# Patient Record
Sex: FEMALE | Race: WHITE | URBAN - METROPOLITAN AREA
[De-identification: names, ages, dates, MRNs, and addresses within clinical notes are randomized per-mention and may not be internally consistent; named-entity substitution may affect disease eponyms.]

---

## 2021-06-10 ENCOUNTER — OFFICE VISIT (OUTPATIENT)
Dept: OPHTHALMOLOGY | Facility: CLINIC | Age: 56
End: 2021-06-10
Attending: OPHTHALMOLOGY
Payer: COMMERCIAL

## 2021-06-10 DIAGNOSIS — H26.8 COMBINED FORM OF NONSENILE CATARACT OF BOTH EYES: ICD-10-CM

## 2021-06-10 DIAGNOSIS — H40.003 GLAUCOMA SUSPECT OF BOTH EYES: Primary | ICD-10-CM

## 2021-06-10 PROCEDURE — 92133 CPTRZD OPH DX IMG PST SGM ON: CPT | Performed by: OPHTHALMOLOGY

## 2021-06-10 PROCEDURE — 76519 ECHO EXAM OF EYE: CPT | Mod: 26 | Performed by: OPHTHALMOLOGY

## 2021-06-10 PROCEDURE — 92083 EXTENDED VISUAL FIELD XM: CPT | Performed by: OPHTHALMOLOGY

## 2021-06-10 PROCEDURE — 99204 OFFICE O/P NEW MOD 45 MIN: CPT | Performed by: OPHTHALMOLOGY

## 2021-06-10 PROCEDURE — 76519 ECHO EXAM OF EYE: CPT | Performed by: OPHTHALMOLOGY

## 2021-06-10 PROCEDURE — G0463 HOSPITAL OUTPT CLINIC VISIT: HCPCS

## 2021-06-10 PROCEDURE — 92025 CPTRIZED CORNEAL TOPOGRAPHY: CPT | Performed by: OPHTHALMOLOGY

## 2021-06-10 ASSESSMENT — REFRACTION_WEARINGRX
OD_CYLINDER: +2.00
OD_AXIS: 037
OS_SPHERE: -5.75
OD_SPHERE: -7.25
OS_AXIS: 142
OS_CYLINDER: +2.00
SPECS_TYPE: SVL

## 2021-06-10 ASSESSMENT — REFRACTION_MANIFEST
OS_ADD: +1.00
OD_CYLINDER: +2.00
OS_SPHERE: -6.25
OS_CYLINDER: +1.75
OD_AXIS: 037
OS_AXIS: 142
OD_SPHERE: -7.75
OD_ADD: +1.00

## 2021-06-10 ASSESSMENT — VISUAL ACUITY
CORRECTION_TYPE: GLASSES
OS_BAT_LOW: 20/25
OD_PH_CC+: +1
OD_CC: 20/40
OS_BAT_HIGH: 20/50
OD_BAT_HIGH: 20/70
OD_BAT_LOW: 20/40
OD_CC+: -1
OS_BAT_MED: 20/40
OD_PH_CC: 20/30
OD_BAT_MED: 20/50
OS_CC: 20/25
METHOD: SNELLEN - LINEAR

## 2021-06-10 ASSESSMENT — TONOMETRY
OS_IOP_MMHG: 17
IOP_METHOD: APPLANATION
OD_IOP_MMHG: 16

## 2021-06-10 ASSESSMENT — EXTERNAL EXAM - LEFT EYE: OS_EXAM: NORMAL

## 2021-06-10 ASSESSMENT — PACHYMETRY
OD_CT(UM): 546
OS_CT(UM): 539

## 2021-06-10 ASSESSMENT — EXTERNAL EXAM - RIGHT EYE: OD_EXAM: NORMAL

## 2021-06-10 ASSESSMENT — CONF VISUAL FIELD
OD_NORMAL: 1
OS_NORMAL: 1
METHOD: COUNTING FINGERS

## 2021-06-10 ASSESSMENT — SLIT LAMP EXAM - LIDS
COMMENTS: MGD, TR BLEPHARITIS
COMMENTS: MGD, TR BLEPHARITIS

## 2021-06-10 ASSESSMENT — CUP TO DISC RATIO
OS_RATIO: 0.55
OD_RATIO: 0.35

## 2021-06-10 NOTE — NURSING NOTE
Chief Complaints and History of Present Illnesses   Patient presents with     Consult For     Chief Complaint(s) and History of Present Illness(es)     Consult For     Laterality: both eyes    Onset: gradual    Onset: years ago    Course: gradually worsening    Associated symptoms: dryness, glare and haloes.  Negative for eye pain, tearing, flashes, floaters and photophobia    Treatments tried: artificial tears    Response to treatment: mild improvement    Pain scale: 0/10              Comments     Pt here for annual CEE.  Pt states they have cataracts after being seen at [facility].  Pt states vision has gotten worse since last visit.      AT's LA NENA Velazuqez Citlaly 10, 2021 8:07 AM

## 2021-06-10 NOTE — PROGRESS NOTES
Chief Complaint(s) and History of Present Illness(es)     Consult For     Laterality: both eyes    Onset: gradual    Onset: years ago    Course: gradually worsening    Associated symptoms: dryness, glare and haloes.  Negative for eye pain,   tearing, flashes, floaters and photophobia    Treatments tried: artificial tears    Response to treatment: mild improvement    Pain scale: 0/10              Comments     Pt here for annual CEE.  Pt states they have cataracts after being seen   at [place].  Pt states vision has gotten worse since last visit.      AT's LA NENA Velazquez Citlaly 10, 2021 8:07 AM              Review of systems for the eyes was negative other than the pertinent positives/negatives listed in the HPI.      Assessment & Plan      Danna Goodman is a 56 year old female with the following diagnoses:   1. Glaucoma suspect of both eyes    2. 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  Noting progressive vision worsening in the right eye > left eye   Followed at [place] and no concerns of glaucoma during those visits.  Told of cataract in the right eye previously  Cataract, right eye > left eye   Visually significant    Stable ON appearance and OCT today  Visual field without evidence of glaucoma   Physiologic cupping, annual dilated fundus exam recommended    Risks, benefits and alternatives to cataract extraction/IOL implantation discussed; consent obtained.  Will schedule surgery today    Special equipment/needs:    Anesthesia:Topical  Dilation:Good  Iris expansion:Not needed  Pseudoexfoliation: No pseudoexfoliation  Trypan Blue: No   Right eye first  Visually significant astigmatism   Discussed surgical corrective options  Reviewed elective nature of surgical correction and patient responsible fee associated  The patient is considering toric Intraocular lens +/- Vivity, will call to confirm  Refractive goal TBD (leaning toward emmetropia)    [facility] I reviewed the indications, risks, benefits, and alternatives of the proposed surgical procedure including, but not limited to, failure obtain the desired result  and need for additional surgery, bleeding, infection, loss of vision, loss of the eye, and the remote possibility of permanent damage to any organ system or death with the use of anesthesia.  I provided multiple opportunities for the questions, answered all questions to the best of my ability, and confirmed that my answers and my discussion were understood.               Attending Physician Attestation:  Complete documentation of historical and exam elements from today's encounter can be found in the full encounter summary report (not reduplicated in this progress note).  I personally obtained the chief complaint(s) and history of present illness.  I confirmed and edited as necessary the review of systems, past medical/surgical history, family history, social history, and examination findings as documented by others; and I examined the patient myself.  I personally reviewed the relevant tests, images, and reports as documented above.  I formulated and edited as necessary the assessment and plan and discussed the findings and management plan with the patient and family. . - Poncho Addison MD

## 2021-06-22 ENCOUNTER — TELEPHONE (OUTPATIENT)
Dept: OPHTHALMOLOGY | Facility: CLINIC | Age: 56
End: 2021-06-22

## 2021-06-22 NOTE — TELEPHONE ENCOUNTER
Called patient to schedule procedure with Dr. Addison, there was no answer.  Left message with my direct line [phone].

## 2021-06-25 NOTE — TELEPHONE ENCOUNTER
Called patient to schedule procedure with Dr. Addison, there was no answer.  Left message with my direct line [phone].

## 2021-07-12 NOTE — TELEPHONE ENCOUNTER
Called patient to schedule procedure with Dr. Addison, there was no answer.  Left message with my direct line [phone].

## 2021-07-27 NOTE — TELEPHONE ENCOUNTER
Called patient to schedule procedure with Dr. Addison, there was no answer.  Left message with my direct line [phone].

## 2021-08-06 NOTE — TELEPHONE ENCOUNTER
Called patient to schedule procedure with Dr. Addison, there was no answer.  Left message with my direct line [phone].

## 2021-08-13 NOTE — TELEPHONE ENCOUNTER
Called patient to schedule procedure with Dr. Addison, there was no answer.  Left message with my direct line [phone].

## 2021-09-10 ENCOUNTER — TELEPHONE (OUTPATIENT)
Dept: OPHTHALMOLOGY | Facility: CLINIC | Age: 56
End: 2021-09-10

## 2021-09-10 NOTE — TELEPHONE ENCOUNTER
Called patient to schedule procedure with Dr. Addison, there was no answer.  Left message with my direct line [phone].

## 2021-10-12 ENCOUNTER — TELEPHONE (OUTPATIENT)
Dept: OPHTHALMOLOGY | Facility: CLINIC | Age: 56
End: 2021-10-12

## 2022-02-02 ENCOUNTER — OFFICE VISIT (OUTPATIENT)
Dept: FAMILY MEDICINE | Facility: CLINIC | Age: 57
End: 2022-02-02
Payer: COMMERCIAL

## 2022-02-02 VITALS
HEART RATE: 113 BPM | WEIGHT: 175.4 LBS | TEMPERATURE: 99.4 F | OXYGEN SATURATION: 94 % | SYSTOLIC BLOOD PRESSURE: 129 MMHG | BODY MASS INDEX: 27.47 KG/M2 | RESPIRATION RATE: 16 BRPM | DIASTOLIC BLOOD PRESSURE: 85 MMHG

## 2022-02-02 DIAGNOSIS — R53.82 CHRONIC FATIGUE: Primary | ICD-10-CM

## 2022-02-02 DIAGNOSIS — F32.1 CURRENT MODERATE EPISODE OF MAJOR DEPRESSIVE DISORDER, UNSPECIFIED WHETHER RECURRENT (H): ICD-10-CM

## 2022-02-02 DIAGNOSIS — L57.0 ACTINIC KERATOSIS: ICD-10-CM

## 2022-02-02 DIAGNOSIS — D64.9 ANEMIA, UNSPECIFIED TYPE: ICD-10-CM

## 2022-02-02 LAB
ALBUMIN SERPL-MCNC: 3.9 G/DL (ref 3.4–5)
ALP SERPL-CCNC: 96 U/L (ref 40–150)
ALT SERPL W P-5'-P-CCNC: 36 U/L (ref 0–50)
ANION GAP SERPL CALCULATED.3IONS-SCNC: 5 MMOL/L (ref 3–14)
AST SERPL W P-5'-P-CCNC: 28 U/L (ref 0–45)
BASOPHILS # BLD AUTO: 0 10E3/UL (ref 0–0.2)
BASOPHILS # BLD AUTO: 0 10E3/UL (ref 0–0.2)
BASOPHILS NFR BLD AUTO: 1 %
BASOPHILS NFR BLD AUTO: 1 %
BILIRUB SERPL-MCNC: 0.4 MG/DL (ref 0.2–1.3)
BUN SERPL-MCNC: 13 MG/DL (ref 7–30)
CALCIUM SERPL-MCNC: 9.3 MG/DL (ref 8.5–10.1)
CHLORIDE BLD-SCNC: 104 MMOL/L (ref 94–109)
CO2 SERPL-SCNC: 27 MMOL/L (ref 20–32)
CREAT SERPL-MCNC: 0.81 MG/DL (ref 0.52–1.04)
CRP SERPL-MCNC: <2.9 MG/L (ref 0–8)
EOSINOPHIL # BLD AUTO: 0.1 10E3/UL (ref 0–0.7)
EOSINOPHIL # BLD AUTO: 0.1 10E3/UL (ref 0–0.7)
EOSINOPHIL NFR BLD AUTO: 3 %
EOSINOPHIL NFR BLD AUTO: 3 %
ERYTHROCYTE [DISTWIDTH] IN BLOOD BY AUTOMATED COUNT: 12.2 % (ref 10–15)
ERYTHROCYTE [DISTWIDTH] IN BLOOD BY AUTOMATED COUNT: 12.2 % (ref 10–15)
GFR SERPL CREATININE-BSD FRML MDRD: 86 ML/MIN/1.73M2
GLUCOSE BLD-MCNC: 113 MG/DL (ref 70–99)
HCT VFR BLD AUTO: 39.4 % (ref 35–47)
HCT VFR BLD AUTO: 39.7 % (ref 35–47)
HGB BLD-MCNC: 12.4 G/DL (ref 11.7–15.7)
HGB BLD-MCNC: 12.5 G/DL (ref 11.7–15.7)
IMM GRANULOCYTES # BLD: 0 10E3/UL
IMM GRANULOCYTES # BLD: 0 10E3/UL
IMM GRANULOCYTES NFR BLD: 0 %
IMM GRANULOCYTES NFR BLD: 0 %
LYMPHOCYTES # BLD AUTO: 1.2 10E3/UL (ref 0.8–5.3)
LYMPHOCYTES # BLD AUTO: 1.3 10E3/UL (ref 0.8–5.3)
LYMPHOCYTES NFR BLD AUTO: 27 %
LYMPHOCYTES NFR BLD AUTO: 29 %
MCH RBC QN AUTO: 30 PG (ref 26.5–33)
MCH RBC QN AUTO: 30.6 PG (ref 26.5–33)
MCHC RBC AUTO-ENTMCNC: 31.2 G/DL (ref 31.5–36.5)
MCHC RBC AUTO-ENTMCNC: 31.7 G/DL (ref 31.5–36.5)
MCV RBC AUTO: 96 FL (ref 78–100)
MCV RBC AUTO: 96 FL (ref 78–100)
MONOCYTES # BLD AUTO: 0.4 10E3/UL (ref 0–1.3)
MONOCYTES # BLD AUTO: 0.4 10E3/UL (ref 0–1.3)
MONOCYTES NFR BLD AUTO: 10 %
MONOCYTES NFR BLD AUTO: 9 %
NEUTROPHILS # BLD AUTO: 2.5 10E3/UL (ref 1.6–8.3)
NEUTROPHILS # BLD AUTO: 2.6 10E3/UL (ref 1.6–8.3)
NEUTROPHILS NFR BLD AUTO: 58 %
NEUTROPHILS NFR BLD AUTO: 59 %
NRBC # BLD AUTO: 0 10E3/UL
NRBC # BLD AUTO: 0 10E3/UL
NRBC BLD AUTO-RTO: 0 /100
NRBC BLD AUTO-RTO: 0 /100
PLATELET # BLD AUTO: 194 10E3/UL (ref 150–450)
PLATELET # BLD AUTO: 198 10E3/UL (ref 150–450)
POTASSIUM BLD-SCNC: 4.5 MMOL/L (ref 3.4–5.3)
PROT SERPL-MCNC: 7.4 G/DL (ref 6.8–8.8)
RBC # BLD AUTO: 4.09 10E6/UL (ref 3.8–5.2)
RBC # BLD AUTO: 4.13 10E6/UL (ref 3.8–5.2)
RETICS # AUTO: 0.07 10E6/UL (ref 0.03–0.1)
RETICS/RBC NFR AUTO: 1.7 % (ref 0.5–2)
SODIUM SERPL-SCNC: 136 MMOL/L (ref 133–144)
TSH SERPL DL<=0.005 MIU/L-ACNC: 2.26 MU/L (ref 0.4–4)
WBC # BLD AUTO: 4.4 10E3/UL (ref 4–11)
WBC # BLD AUTO: 4.4 10E3/UL (ref 4–11)

## 2022-02-02 PROCEDURE — 99203 OFFICE O/P NEW LOW 30 MIN: CPT | Mod: 25 | Performed by: FAMILY MEDICINE

## 2022-02-02 PROCEDURE — 80050 GENERAL HEALTH PANEL: CPT | Performed by: FAMILY MEDICINE

## 2022-02-02 PROCEDURE — 85045 AUTOMATED RETICULOCYTE COUNT: CPT | Performed by: FAMILY MEDICINE

## 2022-02-02 PROCEDURE — 17000 DESTRUCT PREMALG LESION: CPT | Performed by: FAMILY MEDICINE

## 2022-02-02 PROCEDURE — 36415 COLL VENOUS BLD VENIPUNCTURE: CPT | Performed by: FAMILY MEDICINE

## 2022-02-02 PROCEDURE — 86140 C-REACTIVE PROTEIN: CPT | Performed by: FAMILY MEDICINE

## 2022-02-02 RX ORDER — PROPRANOLOL HYDROCHLORIDE 10 MG/1
10 TABLET ORAL 3 TIMES DAILY PRN
COMMUNITY
Start: 2020-12-30

## 2022-02-02 RX ORDER — METHYLPHENIDATE HYDROCHLORIDE 36 MG/1
36 TABLET, EXTENDED RELEASE ORAL
COMMUNITY
Start: 2021-07-15

## 2022-02-03 LAB
PATH REPORT.COMMENTS IMP SPEC: NORMAL
PATH REPORT.FINAL DX SPEC: NORMAL
PATH REPORT.MICROSCOPIC SPEC OTHER STN: NORMAL
PATH REPORT.MICROSCOPIC SPEC OTHER STN: NORMAL
PATH REPORT.RELEVANT HX SPEC: NORMAL

## 2022-02-05 NOTE — PROGRESS NOTES
Danna was seen today for lesion, results and fatigue.    Diagnoses and all orders for this visit:    Chronic fatigue.  Exam is reassuring as there is no sign of lymphadenopathy or other constitutional findings.  Will check a battery of screening lab tests.  -     Comprehensive Metabolic Panel; Future  -     TSH with free T4 reflex; Future  -     CRP inflammation; Future  -     Comprehensive Metabolic Panel  -     TSH with free T4 reflex  -     CRP inflammation    Current moderate episode of major depressive disorder, unspecified whether recurrent (H).  This is an active problem.  She will continue to take bupropion and see her therapist, both of which I endorsed.    Anemia, unspecified type.  Though there have been very mild declines in her blood cell lines over the past several years, they have all been still in the normal range, with the exception of her most recent hemoglobin which was very slightly low.  We will recheck CBC, reticulocyte count, and blood morphology.  -     CBC with platelets differential; Future  -     Reticulocyte count; Future  -     Lab Blood Morphology Pathologist Review; Future  -     CBC with platelets differential  -     Cancel: Reticulocyte count  -     Lab Blood Morphology Pathologist Review    Actinic keratosis.  The actinic keratosis was frozen x2 with liquid nitrogen in a 5-second freeze.  Recheck in retreat in 4 to 6weeks.            Subjective   Danna is a 56 year old who presents for the following health issues: Depression, concern over anemia, cheek lesion.    HPI chief concern today is of fatigue and a worry about a possible blood dyscrasia.  She is a  and is very concerned that over the past 5 years her blood cell line counts have been gradually decreasing.  Denies infections or bleeding episodes.  She does complain prominently of fatigue, however.  Fatigue is generalized with lack of energy and motivation.  Difficult to separate this out from chronic  depression, which has been more active recently.  Denies weight loss.    Also concerned about a lesion on her left cheek.          Review of Systems         Objective    /85 (BP Location: Left arm, Patient Position: Sitting, Cuff Size: Adult Regular)   Pulse 113   Temp 99.4  F (37.4  C) (Oral)   Resp 16   Wt 79.6 kg (175 lb 6.4 oz)   SpO2 94%   Breastfeeding No   BMI 27.47 kg/m    Body mass index is 27.47 kg/m .  Physical Exam  Constitutional:       General: She is not in acute distress.     Appearance: She is not ill-appearing or diaphoretic.   HENT:      Head: Normocephalic.      Mouth/Throat:      Pharynx: Oropharynx is clear.   Neck:      Comments: No thyromegaly  Cardiovascular:      Rate and Rhythm: Regular rhythm.      Heart sounds: Normal heart sounds.   Pulmonary:      Effort: Respiratory distress present.      Breath sounds: Normal breath sounds.   Chest:   Breasts:      Right: No supraclavicular adenopathy.      Left: No supraclavicular adenopathy.       Abdominal:      Palpations: Abdomen is soft.      Comments: No hepatosplenomegaly   Musculoskeletal:         General: No swelling.   Lymphadenopathy:      Head:      Right side of head: No submental, submandibular, tonsillar, preauricular, posterior auricular or occipital adenopathy.      Left side of head: No submental, submandibular, tonsillar, preauricular, posterior auricular or occipital adenopathy.      Cervical: No cervical adenopathy.      Right cervical: No superficial, deep or posterior cervical adenopathy.     Left cervical: No superficial, deep or posterior cervical adenopathy.      Upper Body:      Right upper body: No supraclavicular adenopathy.      Left upper body: No supraclavicular adenopathy.   Skin:     Comments: There is an actinic keratosis on the left cheek   Neurological:      Mental Status: She is alert.   Psychiatric:         Thought Content: Thought content normal.      Comments: Mildly depressed mood            No  results found for any previous visit.

## 2022-02-26 ENCOUNTER — HEALTH MAINTENANCE LETTER (OUTPATIENT)
Age: 57
End: 2022-02-26

## 2022-04-13 ENCOUNTER — OFFICE VISIT (OUTPATIENT)
Dept: FAMILY MEDICINE | Facility: CLINIC | Age: 57
End: 2022-04-13
Payer: COMMERCIAL

## 2022-04-13 VITALS
HEART RATE: 90 BPM | BODY MASS INDEX: 26.75 KG/M2 | DIASTOLIC BLOOD PRESSURE: 73 MMHG | WEIGHT: 170.8 LBS | OXYGEN SATURATION: 97 % | SYSTOLIC BLOOD PRESSURE: 109 MMHG | RESPIRATION RATE: 18 BRPM | TEMPERATURE: 98.9 F

## 2022-04-13 DIAGNOSIS — F32.1 CURRENT MODERATE EPISODE OF MAJOR DEPRESSIVE DISORDER, UNSPECIFIED WHETHER RECURRENT (H): ICD-10-CM

## 2022-04-13 DIAGNOSIS — L57.0 ACTINIC KERATOSIS: Primary | ICD-10-CM

## 2022-04-13 DIAGNOSIS — Z12.11 SCREEN FOR COLON CANCER: ICD-10-CM

## 2022-04-13 DIAGNOSIS — Z12.31 VISIT FOR SCREENING MAMMOGRAM: ICD-10-CM

## 2022-04-13 PROCEDURE — 99214 OFFICE O/P EST MOD 30 MIN: CPT | Performed by: FAMILY MEDICINE

## 2022-04-13 RX ORDER — CLONAZEPAM 0.5 MG/1
TABLET ORAL 2 TIMES DAILY
COMMUNITY
Start: 2022-04-02

## 2022-04-13 ASSESSMENT — PATIENT HEALTH QUESTIONNAIRE - PHQ9: SUM OF ALL RESPONSES TO PHQ QUESTIONS 1-9: 14

## 2022-04-13 NOTE — PATIENT INSTRUCTIONS
Stay on same medicines.    Schedule colonoscopy. If you have not heard from the scheduling office within 2 business days, please call [phone].    If cheek lesion scales up, come back.

## 2022-04-13 NOTE — PROGRESS NOTES
"  65+ Care Package  {TIP MA to complete blue section and provider to do black section :334873}      Danna was seen today for recheck and results.    Diagnoses and all orders for this visit:    Visit for screening mammogram    Screen for colon cancer    Screening for HIV (human immunodeficiency virus)    Need for hepatitis C screening test    Screening for hyperlipidemia        Date of last Wellness Visit:*** {TIP Date is in Health Maintenance  :760081}    Matters  Code Status: No Order      ADVANCED CARE DIRECTIVES {ADVANCED CARE DIRECTIVES:535644::\"unknown\"} / {POLST 4M:452414}  { Link to document Serious Illness Conversation  TIP: Document goals, fears/worries,trade-offs} :992703}    No flowsheet data found.     Medications  Current Outpatient Medications   Medication Sig Dispense Refill     BuPROPion HCl (WELLBUTRIN PO)         clonazePAM (KLONOPIN) 0.5 MG tablet 2 times daily       Methylphenidate HCl ER 36 MG 24H tablet Take 36 mg by mouth       propranolol (INDERAL) 10 MG tablet Take 10 mg by mouth 3 times daily as needed       ARTIFICIAL TEAR OP Apply 1 drop to eye as needed (Patient not taking: Reported on 4/13/2022)       Estropipate (ORTHO-EST 0.625 PO) Take  by mouth.   (Patient not taking: No sig reported)       Medications of Risk:   Currently taking Opioids? {YES/NO:60}  Currently taking Benzodiazepines?  {YES/NO:60}  Currently taking Antipsychotics? {YES/NO:60}  Number of medications on med list: ***  {TIP Medications activity lists number of meds :329902}    Mentation  Does the patient have a cognitive impairment diagnosis on the problem list? {YES***/NO:60}  Does the patient have a depression diagnosis on the problem list? {YES***/NO:60}  PHQ-2 Score:   PHQ-2 ( 1999 [employer]) 4/13/2022 2/2/2022   Q1: Little interest or pleasure in doing things 2 1   Q2: Feeling down, depressed or hopeless 2 1   PHQ-2 Score 4 2   PHQ-2 Total Score (12-17 Years)- Positive if 3 or more points; Administer PHQ-A if " positive - -      PHQ9x3 No flowsheet data found.  Mini Cog: No flowsheet data found.  Caregiver attended visit with patient and received counseling and resources for support of memory.  {YES/NO:60}    Patient Active Problem List   Diagnosis     Current moderate episode of major depressive disorder, unspecified whether recurrent (H)       Mobility   Are there mobility concerns for this patient?: ***  Adaptive Equipment/DME that patient uses: {DME:642156}  Refreshable SmartLink to last Falls Risk assessment score and date:   No data recorded          Patient Health status: {Health status:519069}        {PROVIDER CHARTING PREFERENCE:583008}    Vishnu Clay is a 56 year old who presents for the following health issues {ACCOMPANIED BY STATEMENT (Optional):775785}    HPI     {SUPERLIST (Optional):989023}  {additonal problems for provider to add (Optional):216521}    Review of Systems   {ROS COMP (Optional):185717}      Objective    /73 (BP Location: Left arm, Patient Position: Sitting, Cuff Size: Adult Large)   Pulse 90   Temp 98.9  F (37.2  C) (Oral)   Resp 18   SpO2 97%   Breastfeeding No   There is no height or weight on file to calculate BMI.  Physical Exam   {Exam List (Optional):210079}    {Diagnostic Test Results (Optional):204146}    {AMBULATORY ATTESTATION (Optional):103490}

## 2022-04-17 NOTE — PROGRESS NOTES
Danna was seen today for recheck and results.    Diagnoses and all orders for this visit:    Actinic keratosis.  There is mild erythema over the site but I cannot appreciate any abnormality of skin integrity.  This could be reactive from the last application of liquid nitrogen we will watch this for now.    Visit for screening mammogram.  She states that she will take care of this as it is due.    Screen for colon cancer.  Due for colon cancer screening and she agreed to a colonoscopy.  -     Adult Gastro Ref - Procedure Only; Future    Current moderate episode of major depressive disorder, unspecified whether recurrent (H).  Continues to struggle and PHQ score today is 17.  Is under psychiatrist care who is prescribing bupropion and clonazepam, and she will continue in therapy.            Subjective   Danna is a 56 year old who presents for the following health issues     HPI here for follow-up of several conditions.  She is still struggling with affect of disorder.  Many stressors in her life, including caring for her aging parents.  Actively under the care of a psychiatrist and seeing a psychologist regularly.    She would like the site of the actinic keratosis on her left cheek checked.          Review of Systems         Objective    /73 (BP Location: Left arm, Patient Position: Sitting, Cuff Size: Adult Large)   Pulse 90   Temp 98.9  F (37.2  C) (Oral)   Resp 18   Wt 77.5 kg (170 lb 12.8 oz)   SpO2 97%   Breastfeeding No   BMI 26.75 kg/m    Body mass index is 26.75 kg/m .  Physical Exam  Constitutional:       General: She is not in acute distress.     Appearance: She is not ill-appearing or diaphoretic.   HENT:      Head: Normocephalic.      Mouth/Throat:      Pharynx: Oropharynx is clear.   Neck:      Comments: No thyromegaly  Cardiovascular:      Rate and Rhythm: Regular rhythm.      Heart sounds: Normal heart sounds.   Pulmonary:      Effort: No respiratory distress.      Breath sounds:  Normal breath sounds.   Musculoskeletal:         General: No swelling.   Skin:     Comments: Small area of mild erythema corresponding to previous site of actinic keratosis.  No scaling.   Neurological:      Mental Status: She is alert.   Psychiatric:         Thought Content: Thought content normal.      Comments: Mildly depressed mood            Office Visit on 02/02/2022   Component Date Value Ref Range Status     Sodium 02/02/2022 136  133 - 144 mmol/L Final     Potassium 02/02/2022 4.5  3.4 - 5.3 mmol/L Final     Chloride 02/02/2022 104  94 - 109 mmol/L Final     Carbon Dioxide (CO2) 02/02/2022 27  20 - 32 mmol/L Final     Anion Gap 02/02/2022 5  3 - 14 mmol/L Final     Urea Nitrogen 02/02/2022 13  7 - 30 mg/dL Final     Creatinine 02/02/2022 0.81  0.52 - 1.04 mg/dL Final     Calcium 02/02/2022 9.3  8.5 - 10.1 mg/dL Final     Glucose 02/02/2022 113 (A) 70 - 99 mg/dL Final     Alkaline Phosphatase 02/02/2022 96  40 - 150 U/L Final     AST 02/02/2022 28  0 - 45 U/L Final     ALT 02/02/2022 36  0 - 50 U/L Final     Protein Total 02/02/2022 7.4  6.8 - 8.8 g/dL Final     Albumin 02/02/2022 3.9  3.4 - 5.0 g/dL Final     Bilirubin Total 02/02/2022 0.4  0.2 - 1.3 mg/dL Final     GFR Estimate 02/02/2022 86  >60 mL/min/1.73m2 Final    Effective December 21, 2021 eGFRcr in adults is calculated using the 2021 CKD-EPI creatinine equation which includes age and gender (Janes et al., NEJM, DOI: 10.1056/MZYYuy5218855)     TSH 02/02/2022 2.26  0.40 - 4.00 mU/L Final     CRP Inflammation 02/02/2022 <2.9  0.0 - 8.0 mg/L Final     WBC Count 02/02/2022 4.4  4.0 - 11.0 10e3/uL Final     RBC Count 02/02/2022 4.13  3.80 - 5.20 10e6/uL Final     Hemoglobin 02/02/2022 12.4  11.7 - 15.7 g/dL Final     Hematocrit 02/02/2022 39.7  35.0 - 47.0 % Final     MCV 02/02/2022 96  78 - 100 fL Final     MCH 02/02/2022 30.0  26.5 - 33.0 pg Final     MCHC 02/02/2022 31.2 (A) 31.5 - 36.5 g/dL Final     RDW 02/02/2022 12.2  10.0 - 15.0 % Final      Platelet Count 02/02/2022 198  150 - 450 10e3/uL Final     % Neutrophils 02/02/2022 58  % Final     % Lymphocytes 02/02/2022 29  % Final     % Monocytes 02/02/2022 9  % Final     % Eosinophils 02/02/2022 3  % Final     % Basophils 02/02/2022 1  % Final     % Immature Granulocytes 02/02/2022 0  % Final     NRBCs per 100 WBC 02/02/2022 0  <1 /100 Final     Absolute Neutrophils 02/02/2022 2.5  1.6 - 8.3 10e3/uL Final     Absolute Lymphocytes 02/02/2022 1.3  0.8 - 5.3 10e3/uL Final     Absolute Monocytes 02/02/2022 0.4  0.0 - 1.3 10e3/uL Final     Absolute Eosinophils 02/02/2022 0.1  0.0 - 0.7 10e3/uL Final     Absolute Basophils 02/02/2022 0.0  0.0 - 0.2 10e3/uL Final     Absolute Immature Granulocytes 02/02/2022 0.0  <=0.4 10e3/uL Final     Absolute NRBCs 02/02/2022 0.0  10e3/uL Final     Final Diagnosis 02/02/2022    Final                    Value:This result contains rich text formatting which cannot be displayed here.     Clinical Information 02/02/2022    Final                    Value:This result contains rich text formatting which cannot be displayed here.     Peripheral Smear 02/02/2022    Final                    Value:This result contains rich text formatting which cannot be displayed here.     Peripheral Hematologic Data 02/02/2022    Final                    Value:This result contains rich text formatting which cannot be displayed here.     Performing Labs 02/02/2022    Final                    Value:This result contains rich text formatting which cannot be displayed here.     WBC Count 02/02/2022 4.4  4.0 - 11.0 10e3/uL Final     RBC Count 02/02/2022 4.09  3.80 - 5.20 10e6/uL Final     Hemoglobin 02/02/2022 12.5  11.7 - 15.7 g/dL Final     Hematocrit 02/02/2022 39.4  35.0 - 47.0 % Final     MCV 02/02/2022 96  78 - 100 fL Final     MCH 02/02/2022 30.6  26.5 - 33.0 pg Final     MCHC 02/02/2022 31.7  31.5 - 36.5 g/dL Final     RDW 02/02/2022 12.2  10.0 - 15.0 % Final     Platelet Count 02/02/2022 194  150 -  450 10e3/uL Final     % Neutrophils 02/02/2022 59  % Final     % Lymphocytes 02/02/2022 27  % Final     % Monocytes 02/02/2022 10  % Final     % Eosinophils 02/02/2022 3  % Final     % Basophils 02/02/2022 1  % Final     % Immature Granulocytes 02/02/2022 0  % Final     NRBCs per 100 WBC 02/02/2022 0  <1 /100 Final     Absolute Neutrophils 02/02/2022 2.6  1.6 - 8.3 10e3/uL Final     Absolute Lymphocytes 02/02/2022 1.2  0.8 - 5.3 10e3/uL Final     Absolute Monocytes 02/02/2022 0.4  0.0 - 1.3 10e3/uL Final     Absolute Eosinophils 02/02/2022 0.1  0.0 - 0.7 10e3/uL Final     Absolute Basophils 02/02/2022 0.0  0.0 - 0.2 10e3/uL Final     Absolute Immature Granulocytes 02/02/2022 0.0  <=0.4 10e3/uL Final     Absolute NRBCs 02/02/2022 0.0  10e3/uL Final     % Reticulocyte 02/02/2022 1.7  0.5 - 2.0 % Final     Absolute Reticulocyte 02/02/2022 0.071  0.025 - 0.095 10e6/uL Final

## 2022-05-25 ENCOUNTER — OFFICE VISIT (OUTPATIENT)
Dept: FAMILY MEDICINE | Facility: CLINIC | Age: 57
End: 2022-05-25
Payer: COMMERCIAL

## 2022-05-25 VITALS
WEIGHT: 178.4 LBS | BODY MASS INDEX: 27.94 KG/M2 | RESPIRATION RATE: 16 BRPM | SYSTOLIC BLOOD PRESSURE: 115 MMHG | DIASTOLIC BLOOD PRESSURE: 79 MMHG | HEART RATE: 77 BPM | OXYGEN SATURATION: 97 %

## 2022-05-25 DIAGNOSIS — R63.5 WEIGHT GAIN: Primary | ICD-10-CM

## 2022-05-25 PROCEDURE — 99213 OFFICE O/P EST LOW 20 MIN: CPT | Performed by: FAMILY MEDICINE

## 2022-05-25 NOTE — PROGRESS NOTES
Danna was seen today for weight check.    Diagnoses and all orders for this visit:    Weight gain.  From review of medications and recent lab work, as well as unremarkable physical exam, feel that this is likely due to caloric intake and decreased physical activity.  I counseled her regarding increasing physical activity, both in decreasing sedentary time and increasing more active periods recommending moderate intensity exercise such as brisk walking.  I also offered nutrition counseling and the patient readily accepted.  Follow-up in 2 months.  -     Nutrition Referral; Future            Subjective   Danna is a 57 year old who presents for the following health issues: Weight gain    HPI the patient would like to talk about her weight gain over the past 5 to 10 years.  Over the past decade she has gained 38 pounds, and it has been more pronounced over the past 2 years.  She is under significant psychosocial stress both with situations involving her children and her aging parent.  She does not exercise and admits that at home she does not get very much physical activity, particularly since the start of the pandemic.  She has tried to reduce caloric intake and states that she does not believe that she can reduce any further.  She is frustrated that she has not been able to lose weight.          Review of Systems         Objective    /79 (BP Location: Left arm, Patient Position: Sitting, Cuff Size: Adult Regular)   Pulse 77   Resp 16   Wt 80.9 kg (178 lb 6.4 oz)   SpO2 97%   Breastfeeding No   BMI 27.94 kg/m    Body mass index is 27.94 kg/m .  Physical Exam  Constitutional:       General: She is not in acute distress.     Appearance: She is not ill-appearing or diaphoretic.   HENT:      Head: Normocephalic.      Mouth/Throat:      Pharynx: Oropharynx is clear.   Neck:      Comments: No thyromegaly  Cardiovascular:      Rate and Rhythm: Regular rhythm.      Heart sounds: Normal heart sounds.    Pulmonary:      Effort: No respiratory distress.      Breath sounds: Normal breath sounds.   Abdominal:      General: Abdomen is flat. Bowel sounds are normal. There is no distension.      Palpations: Abdomen is soft. There is no mass.   Musculoskeletal:         General: No swelling.   Skin:     Comments: Small area of mild erythema corresponding to previous site of actinic keratosis.  No scaling.   Neurological:      Mental Status: She is alert.            Office Visit on 02/02/2022   Component Date Value Ref Range Status     Sodium 02/02/2022 136  133 - 144 mmol/L Final     Potassium 02/02/2022 4.5  3.4 - 5.3 mmol/L Final     Chloride 02/02/2022 104  94 - 109 mmol/L Final     Carbon Dioxide (CO2) 02/02/2022 27  20 - 32 mmol/L Final     Anion Gap 02/02/2022 5  3 - 14 mmol/L Final     Urea Nitrogen 02/02/2022 13  7 - 30 mg/dL Final     Creatinine 02/02/2022 0.81  0.52 - 1.04 mg/dL Final     Calcium 02/02/2022 9.3  8.5 - 10.1 mg/dL Final     Glucose 02/02/2022 113 (A) 70 - 99 mg/dL Final     Alkaline Phosphatase 02/02/2022 96  40 - 150 U/L Final     AST 02/02/2022 28  0 - 45 U/L Final     ALT 02/02/2022 36  0 - 50 U/L Final     Protein Total 02/02/2022 7.4  6.8 - 8.8 g/dL Final     Albumin 02/02/2022 3.9  3.4 - 5.0 g/dL Final     Bilirubin Total 02/02/2022 0.4  0.2 - 1.3 mg/dL Final     GFR Estimate 02/02/2022 86  >60 mL/min/1.73m2 Final    Effective December 21, 2021 eGFRcr in adults is calculated using the 2021 CKD-EPI creatinine equation which includes age and gender (Janes et al., NEJ, DOI: 10.1056/MAWOzz4001566)     TSH 02/02/2022 2.26  0.40 - 4.00 mU/L Final     CRP Inflammation 02/02/2022 <2.9  0.0 - 8.0 mg/L Final     WBC Count 02/02/2022 4.4  4.0 - 11.0 10e3/uL Final     RBC Count 02/02/2022 4.13  3.80 - 5.20 10e6/uL Final     Hemoglobin 02/02/2022 12.4  11.7 - 15.7 g/dL Final     Hematocrit 02/02/2022 39.7  35.0 - 47.0 % Final     MCV 02/02/2022 96  78 - 100 fL Final     MCH 02/02/2022 30.0  26.5 - 33.0  pg Final     MCHC 02/02/2022 31.2 (A) 31.5 - 36.5 g/dL Final     RDW 02/02/2022 12.2  10.0 - 15.0 % Final     Platelet Count 02/02/2022 198  150 - 450 10e3/uL Final     % Neutrophils 02/02/2022 58  % Final     % Lymphocytes 02/02/2022 29  % Final     % Monocytes 02/02/2022 9  % Final     % Eosinophils 02/02/2022 3  % Final     % Basophils 02/02/2022 1  % Final     % Immature Granulocytes 02/02/2022 0  % Final     NRBCs per 100 WBC 02/02/2022 0  <1 /100 Final     Absolute Neutrophils 02/02/2022 2.5  1.6 - 8.3 10e3/uL Final     Absolute Lymphocytes 02/02/2022 1.3  0.8 - 5.3 10e3/uL Final     Absolute Monocytes 02/02/2022 0.4  0.0 - 1.3 10e3/uL Final     Absolute Eosinophils 02/02/2022 0.1  0.0 - 0.7 10e3/uL Final     Absolute Basophils 02/02/2022 0.0  0.0 - 0.2 10e3/uL Final     Absolute Immature Granulocytes 02/02/2022 0.0  <=0.4 10e3/uL Final     Absolute NRBCs 02/02/2022 0.0  10e3/uL Final     Final Diagnosis 02/02/2022    Final                    Value:This result contains rich text formatting which cannot be displayed here.     Clinical Information 02/02/2022    Final                    Value:This result contains rich text formatting which cannot be displayed here.     Peripheral Smear 02/02/2022    Final                    Value:This result contains rich text formatting which cannot be displayed here.     Peripheral Hematologic Data 02/02/2022    Final                    Value:This result contains rich text formatting which cannot be displayed here.     Performing Labs 02/02/2022    Final                    Value:This result contains rich text formatting which cannot be displayed here.     WBC Count 02/02/2022 4.4  4.0 - 11.0 10e3/uL Final     RBC Count 02/02/2022 4.09  3.80 - 5.20 10e6/uL Final     Hemoglobin 02/02/2022 12.5  11.7 - 15.7 g/dL Final     Hematocrit 02/02/2022 39.4  35.0 - 47.0 % Final     MCV 02/02/2022 96  78 - 100 fL Final     MCH 02/02/2022 30.6  26.5 - 33.0 pg Final     MCHC 02/02/2022 31.7   31.5 - 36.5 g/dL Final     RDW 02/02/2022 12.2  10.0 - 15.0 % Final     Platelet Count 02/02/2022 194  150 - 450 10e3/uL Final     % Neutrophils 02/02/2022 59  % Final     % Lymphocytes 02/02/2022 27  % Final     % Monocytes 02/02/2022 10  % Final     % Eosinophils 02/02/2022 3  % Final     % Basophils 02/02/2022 1  % Final     % Immature Granulocytes 02/02/2022 0  % Final     NRBCs per 100 WBC 02/02/2022 0  <1 /100 Final     Absolute Neutrophils 02/02/2022 2.6  1.6 - 8.3 10e3/uL Final     Absolute Lymphocytes 02/02/2022 1.2  0.8 - 5.3 10e3/uL Final     Absolute Monocytes 02/02/2022 0.4  0.0 - 1.3 10e3/uL Final     Absolute Eosinophils 02/02/2022 0.1  0.0 - 0.7 10e3/uL Final     Absolute Basophils 02/02/2022 0.0  0.0 - 0.2 10e3/uL Final     Absolute Immature Granulocytes 02/02/2022 0.0  <=0.4 10e3/uL Final     Absolute NRBCs 02/02/2022 0.0  10e3/uL Final     % Reticulocyte 02/02/2022 1.7  0.5 - 2.0 % Final     Absolute Reticulocyte 02/02/2022 0.071  0.025 - 0.095 10e6/uL Final

## 2022-05-25 NOTE — PATIENT INSTRUCTIONS
Reduce sedentary time:  recommend standing activities to replace sitting activities.  Also work on strategies where you get up at least once an hour.    Increase active time (exercise):  moderate intensity (able to carry on a conversation while doing it or a 5-6/10 on a 10 pt scale).  Eg brisk walking.    See me in 6-8 weeks.

## 2022-10-30 ENCOUNTER — HEALTH MAINTENANCE LETTER (OUTPATIENT)
Age: 57
End: 2022-10-30

## 2023-04-08 ENCOUNTER — HEALTH MAINTENANCE LETTER (OUTPATIENT)
Age: 58
End: 2023-04-08

## 2023-07-05 ENCOUNTER — VIRTUAL VISIT (OUTPATIENT)
Dept: URGENT CARE | Facility: CLINIC | Age: 58
End: 2023-07-05
Payer: COMMERCIAL

## 2023-07-05 ENCOUNTER — E-VISIT (OUTPATIENT)
Dept: URGENT CARE | Facility: CLINIC | Age: 58
End: 2023-07-05
Payer: COMMERCIAL

## 2023-07-05 DIAGNOSIS — N39.0 URINARY TRACT INFECTION WITHOUT HEMATURIA, SITE UNSPECIFIED: Primary | ICD-10-CM

## 2023-07-05 DIAGNOSIS — R30.0 DIFFICULT OR PAINFUL URINATION: Primary | ICD-10-CM

## 2023-07-05 PROCEDURE — 99207 PR NON-BILLABLE SERV PER CHARTING: CPT | Performed by: PHYSICIAN ASSISTANT

## 2023-07-05 PROCEDURE — 99213 OFFICE O/P EST LOW 20 MIN: CPT | Mod: VID

## 2023-07-05 RX ORDER — NITROFURANTOIN 25; 75 MG/1; MG/1
100 CAPSULE ORAL 2 TIMES DAILY
Qty: 10 CAPSULE | Refills: 0 | Status: SHIPPED | OUTPATIENT
Start: 2023-07-05 | End: 2023-07-10

## 2023-07-05 NOTE — PATIENT INSTRUCTIONS
Take full course of antibiotics.  Drink plenty of fluids. If you develop any vomiting, high fevers/chills/sweats or lower/mid back pain, these can be signs of a kidney infection and you should be seen in urgent care or in the ER.  Prevention of future infections by drinking cranberry juice, urination after intercourse and wiping from front to back after using the toilet.  Please follow up with primary care provider if symptoms return, if you're not improving, worsening or new symptoms or for any adverse reactions to medications.

## 2023-07-05 NOTE — PATIENT INSTRUCTIONS
Dear Danna Goodman,     After reviewing your responses, I would like you to come in for a urine test to make sure we treat you correctly. This urine test is to evaluate you for a possible urinary tract infection, and should be scheduled for today or tomorrow. Schedule a Lab Only appointment here.     Lab appointments are not available at most locations on the weekends. If no Lab Only appointment is available, you should be seen in any of our convenient Walk-in or Urgent Care Centers, which can be found on our website here.     You will receive instructions with your results in [facility] once they are available.     If your symptoms worsen, you develop pain in your back or stomach, develop fevers, or are not improving in 5 days, please contact your primary care provider for an appointment or visit a Walk-in or Urgent Care Center to be seen.     Thanks again for choosing us as your health care partner,     Vangie Burch PA-C

## 2023-07-05 NOTE — PROGRESS NOTES
Danna is a 58 year old female who presents for a billable video visit.    ASSESSMENT/PLAN:  Diagnoses and all orders for this visit:    Urinary tract infection without hematuria, site unspecified  -     nitroFURantoin macrocrystal-monohydrate (MACROBID) 100 MG capsule; Take 1 capsule (100 mg) by mouth 2 times daily for 5 days        Patient Instructions   Take full course of antibiotics.  Drink plenty of fluids. If you develop any vomiting, high fevers/chills/sweats or lower/mid back pain, these can be signs of a kidney infection and you should be seen in urgent care or in the ER.  Prevention of future infections by drinking cranberry juice, urination after intercourse and wiping from front to back after using the toilet.  Please follow up with primary care provider if symptoms return, if you're not improving, worsening or new symptoms or for any adverse reactions to medications.         SUBJECTIVE:  Patient reports that she is having urinary urgency, frequency, and some mild pain with urination. No blood in the urine. No fever/sweats/chills. No back pain. No nausea/vomiting. Has taken Azo over the counter and is drinking cranberry juice. She hasn't had a urine infection for years. No possibility of pregnancy.     OBJECTIVE:  Vitals not done due to this being a virtual visit    GENERAL: Healthy, alert and no distress  EYES: Eyes grossly normal to inspection.  No discharge or erythema, or obvious scleral/conjunctival abnormalities.  RESP: No audible wheeze, cough, or visible cyanosis.  No visible retractions or increased work of breathing.    SKIN: Visible skin clear. No significant rash, abnormal pigmentation or lesions.  NEURO: Cranial nerves grossly intact.  Mentation and speech appropriate for age.  PSYCH: Mentation appears normal, affect normal/bright, judgement and insight intact, normal speech and appearance well-groomed.        Video-Visit Details    Type of service:  Video Visit  Video Start Time: 6:33  PM  Video End Time:6:39 PM    Originating Location (pt. Location): Home    Distant Location (provider location):  [facility]     Platform used for Video Visit: KRISTEL Guerra, CNP

## 2023-08-30 ENCOUNTER — CARE COORDINATION (OUTPATIENT)
Dept: FAMILY MEDICINE | Facility: CLINIC | Age: 58
End: 2023-08-30

## 2023-08-30 ENCOUNTER — OFFICE VISIT (OUTPATIENT)
Dept: FAMILY MEDICINE | Facility: CLINIC | Age: 58
End: 2023-08-30
Payer: COMMERCIAL

## 2023-08-30 VITALS
HEIGHT: 67 IN | RESPIRATION RATE: 16 BRPM | BODY MASS INDEX: 21.67 KG/M2 | WEIGHT: 138.1 LBS | HEART RATE: 76 BPM | SYSTOLIC BLOOD PRESSURE: 102 MMHG | DIASTOLIC BLOOD PRESSURE: 70 MMHG | OXYGEN SATURATION: 98 %

## 2023-08-30 DIAGNOSIS — R53.83 OTHER FATIGUE: ICD-10-CM

## 2023-08-30 DIAGNOSIS — R30.0 DIFFICULT OR PAINFUL URINATION: ICD-10-CM

## 2023-08-30 DIAGNOSIS — R63.4 WEIGHT LOSS: Primary | ICD-10-CM

## 2023-08-30 LAB
ALBUMIN SERPL BCG-MCNC: 4.5 G/DL (ref 3.5–5.2)
ALBUMIN UR-MCNC: NEGATIVE MG/DL
ALP SERPL-CCNC: 87 U/L (ref 35–104)
ALT SERPL W P-5'-P-CCNC: 26 U/L (ref 0–50)
ANION GAP SERPL CALCULATED.3IONS-SCNC: 12 MMOL/L (ref 7–15)
APPEARANCE UR: CLEAR
AST SERPL W P-5'-P-CCNC: 30 U/L (ref 0–45)
BACTERIA #/AREA URNS HPF: NORMAL /HPF
BASOPHILS # BLD AUTO: 0 10E3/UL (ref 0–0.2)
BASOPHILS NFR BLD AUTO: 0 %
BILIRUB SERPL-MCNC: 0.2 MG/DL
BILIRUB UR QL STRIP: NEGATIVE
BUN SERPL-MCNC: 12.7 MG/DL (ref 6–20)
CALCIUM SERPL-MCNC: 9.6 MG/DL (ref 8.6–10)
CHLORIDE SERPL-SCNC: 103 MMOL/L (ref 98–107)
COLOR UR AUTO: YELLOW
CREAT SERPL-MCNC: 0.81 MG/DL (ref 0.51–0.95)
CRP SERPL-MCNC: <3 MG/L
DEPRECATED HCO3 PLAS-SCNC: 26 MMOL/L (ref 22–29)
EOSINOPHIL # BLD AUTO: 0.1 10E3/UL (ref 0–0.7)
EOSINOPHIL NFR BLD AUTO: 1 %
ERYTHROCYTE [DISTWIDTH] IN BLOOD BY AUTOMATED COUNT: 12.7 % (ref 10–15)
FOLATE SERPL-MCNC: 26.9 NG/ML (ref 4.6–34.8)
GFR SERPL CREATININE-BSD FRML MDRD: 85 ML/MIN/1.73M2
GLUCOSE SERPL-MCNC: 97 MG/DL (ref 70–99)
GLUCOSE UR STRIP-MCNC: NEGATIVE MG/DL
HCT VFR BLD AUTO: 39.2 % (ref 35–47)
HGB BLD-MCNC: 12.2 G/DL (ref 11.7–15.7)
HGB UR QL STRIP: NEGATIVE
IMM GRANULOCYTES # BLD: 0 10E3/UL
IMM GRANULOCYTES NFR BLD: 0 %
KETONES UR STRIP-MCNC: ABNORMAL MG/DL
LEUKOCYTE ESTERASE UR QL STRIP: ABNORMAL
LYMPHOCYTES # BLD AUTO: 1.2 10E3/UL (ref 0.8–5.3)
LYMPHOCYTES NFR BLD AUTO: 26 %
MCH RBC QN AUTO: 32.4 PG (ref 26.5–33)
MCHC RBC AUTO-ENTMCNC: 31.1 G/DL (ref 31.5–36.5)
MCV RBC AUTO: 104 FL (ref 78–100)
MONOCYTES # BLD AUTO: 0.3 10E3/UL (ref 0–1.3)
MONOCYTES NFR BLD AUTO: 7 %
NEUTROPHILS # BLD AUTO: 3.1 10E3/UL (ref 1.6–8.3)
NEUTROPHILS NFR BLD AUTO: 66 %
NITRATE UR QL: NEGATIVE
PH UR STRIP: 6 [PH] (ref 5–8)
PLATELET # BLD AUTO: 177 10E3/UL (ref 150–450)
POTASSIUM SERPL-SCNC: 4.8 MMOL/L (ref 3.4–5.3)
PROT SERPL-MCNC: 7.1 G/DL (ref 6.4–8.3)
RBC # BLD AUTO: 3.77 10E6/UL (ref 3.8–5.2)
RBC #/AREA URNS AUTO: NORMAL /HPF
SODIUM SERPL-SCNC: 141 MMOL/L (ref 136–145)
SP GR UR STRIP: >=1.03 (ref 1–1.03)
SQUAMOUS #/AREA URNS AUTO: NORMAL /LPF
TSH SERPL DL<=0.005 MIU/L-ACNC: 1.59 UIU/ML (ref 0.3–4.2)
UROBILINOGEN UR STRIP-ACNC: 0.2 E.U./DL
VIT B12 SERPL-MCNC: 801 PG/ML (ref 232–1245)
WBC # BLD AUTO: 4.8 10E3/UL (ref 4–11)
WBC #/AREA URNS AUTO: NORMAL /HPF

## 2023-08-30 PROCEDURE — 82607 VITAMIN B-12: CPT | Performed by: FAMILY MEDICINE

## 2023-08-30 PROCEDURE — 82746 ASSAY OF FOLIC ACID SERUM: CPT | Performed by: FAMILY MEDICINE

## 2023-08-30 PROCEDURE — 80053 COMPREHEN METABOLIC PANEL: CPT | Performed by: FAMILY MEDICINE

## 2023-08-30 PROCEDURE — 85025 COMPLETE CBC W/AUTO DIFF WBC: CPT | Performed by: FAMILY MEDICINE

## 2023-08-30 PROCEDURE — 84443 ASSAY THYROID STIM HORMONE: CPT | Performed by: FAMILY MEDICINE

## 2023-08-30 PROCEDURE — 99214 OFFICE O/P EST MOD 30 MIN: CPT | Performed by: FAMILY MEDICINE

## 2023-08-30 PROCEDURE — 82306 VITAMIN D 25 HYDROXY: CPT | Performed by: FAMILY MEDICINE

## 2023-08-30 PROCEDURE — 81001 URINALYSIS AUTO W/SCOPE: CPT | Performed by: FAMILY MEDICINE

## 2023-08-30 PROCEDURE — 86140 C-REACTIVE PROTEIN: CPT | Performed by: FAMILY MEDICINE

## 2023-08-30 PROCEDURE — 36415 COLL VENOUS BLD VENIPUNCTURE: CPT | Performed by: FAMILY MEDICINE

## 2023-08-30 ASSESSMENT — PATIENT HEALTH QUESTIONNAIRE - PHQ9: SUM OF ALL RESPONSES TO PHQ QUESTIONS 1-9: 3

## 2023-08-30 NOTE — PROGRESS NOTES
met with patient after she brought up needing help finding resources for her parents.    Patient reports she went to her parents' house and felt like they could use more help at the home. Patient feels they would benefit from someone helping with cleaning and personal care such as nail clipping.     VIGNESH gave patient contact information for the Senior Linkage Line and encouraged her to call and request a [place] [place] be [place] to help identify what resources her parents might be eligible for.     Patient in agreement with this plan and will call VIGNESH if she has any further questions.    MADHURI Dodd

## 2023-08-30 NOTE — PROGRESS NOTES
"Danna was seen today for follow up, blood draw and weight loss.    Diagnoses and all orders for this visit:    Weight loss.  The patient has lost 40 pounds in the past 3 months.  She has not been trying to lose weight, but throughout the spring was concerned that she was overweight.  She states that she feels some of this is due to a form of laziness which she does not want to put the energy into preparing food.  When food is prepared for her when she is at a restaurant, she has a good appetite.  There are no other signs that would suggest a systemic process or occult malignancy.  No history of diarrhea, vomiting, or other GI abnormality.  Exam today is reassuring and that there is no organomegaly, signs of cachexia, or lymphadenopathy.  I will obtain a battery of lab tests to explore possible metabolic causes.    Other fatigue.  She reports a lack of energy and motivation.  Admits that she is under prolonged significant psychosocial stress, mostly dealing with her ill parents and significant health issues with her son.  The physical exam as noted above is reassuring that there is no significant systemic process going on but I will check a battery of lab test to investigate this as well.              Subjective     Follow Up (Fatigue), Blood Draw (Repeat CBC), and Weight Loss (Discuss weight)        HPI   She returns for follow-up of weight gain and depression.  Reports that instead of further weight gain, she has noticed significant weight loss over the past 3 months.  She feels fatigued most of the time but is under very significant psychosocial stress.  Her mood is not that bad, and has been improved and that she has developed a new romantic relationship with a man and this has been a source of pleasure for her.        Review of Systems         Objective    /70   Pulse 76   Resp 16   Ht 1.702 m (5' 7\")   Wt 62.6 kg (138 lb 1.6 oz)   SpO2 98%   Breastfeeding No   BMI 21.63 kg/m    Body mass index " is 21.63 kg/m .  Physical Exam  Constitutional:       General: She is not in acute distress.     Appearance: She is not ill-appearing or diaphoretic.   HENT:      Head: Normocephalic.      Mouth/Throat:      Pharynx: Oropharynx is clear.   Neck:      Thyroid: No thyroid mass or thyromegaly.      Comments: No thyromegaly  Cardiovascular:      Rate and Rhythm: Regular rhythm.      Heart sounds: Normal heart sounds.   Pulmonary:      Effort: No respiratory distress.      Breath sounds: Normal breath sounds.   Abdominal:      General: Abdomen is flat. Bowel sounds are normal. There is no distension.      Palpations: Abdomen is soft. There is no mass.      Comments: No hepatosplenomegaly   Musculoskeletal:         General: No swelling.      Right lower leg: No edema.      Left lower leg: No edema.   Lymphadenopathy:      Head:      Right side of head: No submental, submandibular, tonsillar, preauricular, posterior auricular or occipital adenopathy.      Left side of head: No submental, submandibular, tonsillar, preauricular, posterior auricular or occipital adenopathy.      Cervical: No cervical adenopathy.      Right cervical: No superficial, deep or posterior cervical adenopathy.     Left cervical: No superficial, deep or posterior cervical adenopathy.      Upper Body:      Right upper body: No supraclavicular or axillary adenopathy.      Left upper body: No supraclavicular or axillary adenopathy.   Skin:     Comments: Small area of mild erythema corresponding to previous site of actinic keratosis.  No scaling.   Neurological:      Mental Status: She is alert.   Psychiatric:      Comments: Affect is slightly flat.  She is alert with articulate speech.            Office Visit on 02/02/2022   Component Date Value Ref Range Status    Sodium 02/02/2022 136  133 - 144 mmol/L Final    Potassium 02/02/2022 4.5  3.4 - 5.3 mmol/L Final    Chloride 02/02/2022 104  94 - 109 mmol/L Final    Carbon Dioxide (CO2) 02/02/2022 27  20 -  32 mmol/L Final    Anion Gap 02/02/2022 5  3 - 14 mmol/L Final    Urea Nitrogen 02/02/2022 13  7 - 30 mg/dL Final    Creatinine 02/02/2022 0.81  0.52 - 1.04 mg/dL Final    Calcium 02/02/2022 9.3  8.5 - 10.1 mg/dL Final    Glucose 02/02/2022 113 (H)  70 - 99 mg/dL Final    Alkaline Phosphatase 02/02/2022 96  40 - 150 U/L Final    AST 02/02/2022 28  0 - 45 U/L Final    ALT 02/02/2022 36  0 - 50 U/L Final    Protein Total 02/02/2022 7.4  6.8 - 8.8 g/dL Final    Albumin 02/02/2022 3.9  3.4 - 5.0 g/dL Final    Bilirubin Total 02/02/2022 0.4  0.2 - 1.3 mg/dL Final    GFR Estimate 02/02/2022 86  >60 mL/min/1.73m2 Final    Effective December 21, 2021 eGFRcr in adults is calculated using the 2021 CKD-EPI creatinine equation which includes age and gender (Janes et al., NEJ, DOI: 10.1056/JZITor0357232)    TSH 02/02/2022 2.26  0.40 - 4.00 mU/L Final    CRP Inflammation 02/02/2022 <2.9  0.0 - 8.0 mg/L Final    WBC Count 02/02/2022 4.4  4.0 - 11.0 10e3/uL Final    RBC Count 02/02/2022 4.13  3.80 - 5.20 10e6/uL Final    Hemoglobin 02/02/2022 12.4  11.7 - 15.7 g/dL Final    Hematocrit 02/02/2022 39.7  35.0 - 47.0 % Final    MCV 02/02/2022 96  78 - 100 fL Final    MCH 02/02/2022 30.0  26.5 - 33.0 pg Final    MCHC 02/02/2022 31.2 (L)  31.5 - 36.5 g/dL Final    RDW 02/02/2022 12.2  10.0 - 15.0 % Final    Platelet Count 02/02/2022 198  150 - 450 10e3/uL Final    % Neutrophils 02/02/2022 58  % Final    % Lymphocytes 02/02/2022 29  % Final    % Monocytes 02/02/2022 9  % Final    % Eosinophils 02/02/2022 3  % Final    % Basophils 02/02/2022 1  % Final    % Immature Granulocytes 02/02/2022 0  % Final    NRBCs per 100 WBC 02/02/2022 0  <1 /100 Final    Absolute Neutrophils 02/02/2022 2.5  1.6 - 8.3 10e3/uL Final    Absolute Lymphocytes 02/02/2022 1.3  0.8 - 5.3 10e3/uL Final    Absolute Monocytes 02/02/2022 0.4  0.0 - 1.3 10e3/uL Final    Absolute Eosinophils 02/02/2022 0.1  0.0 - 0.7 10e3/uL Final    Absolute Basophils 02/02/2022 0.0  0.0  - 0.2 10e3/uL Final    Absolute Immature Granulocytes 02/02/2022 0.0  <=0.4 10e3/uL Final    Absolute NRBCs 02/02/2022 0.0  10e3/uL Final    Final Diagnosis 02/02/2022    Final                    Value:This result contains rich text formatting which cannot be displayed here.    Clinical Information 02/02/2022    Final                    Value:This result contains rich text formatting which cannot be displayed here.    Peripheral Smear 02/02/2022    Final                    Value:This result contains rich text formatting which cannot be displayed here.    Peripheral Hematologic Data 02/02/2022    Final                    Value:This result contains rich text formatting which cannot be displayed here.    Performing Labs 02/02/2022    Final                    Value:This result contains rich text formatting which cannot be displayed here.    WBC Count 02/02/2022 4.4  4.0 - 11.0 10e3/uL Final    RBC Count 02/02/2022 4.09  3.80 - 5.20 10e6/uL Final    Hemoglobin 02/02/2022 12.5  11.7 - 15.7 g/dL Final    Hematocrit 02/02/2022 39.4  35.0 - 47.0 % Final    MCV 02/02/2022 96  78 - 100 fL Final    MCH 02/02/2022 30.6  26.5 - 33.0 pg Final    MCHC 02/02/2022 31.7  31.5 - 36.5 g/dL Final    RDW 02/02/2022 12.2  10.0 - 15.0 % Final    Platelet Count 02/02/2022 194  150 - 450 10e3/uL Final    % Neutrophils 02/02/2022 59  % Final    % Lymphocytes 02/02/2022 27  % Final    % Monocytes 02/02/2022 10  % Final    % Eosinophils 02/02/2022 3  % Final    % Basophils 02/02/2022 1  % Final    % Immature Granulocytes 02/02/2022 0  % Final    NRBCs per 100 WBC 02/02/2022 0  <1 /100 Final    Absolute Neutrophils 02/02/2022 2.6  1.6 - 8.3 10e3/uL Final    Absolute Lymphocytes 02/02/2022 1.2  0.8 - 5.3 10e3/uL Final    Absolute Monocytes 02/02/2022 0.4  0.0 - 1.3 10e3/uL Final    Absolute Eosinophils 02/02/2022 0.1  0.0 - 0.7 10e3/uL Final    Absolute Basophils 02/02/2022 0.0  0.0 - 0.2 10e3/uL Final    Absolute Immature Granulocytes 02/02/2022  0.0  <=0.4 10e3/uL Final    Absolute NRBCs 02/02/2022 0.0  10e3/uL Final    % Reticulocyte 02/02/2022 1.7  0.5 - 2.0 % Final    Absolute Reticulocyte 02/02/2022 0.071  0.025 - 0.095 10e6/uL Final

## 2023-08-31 LAB — DEPRECATED CALCIDIOL+CALCIFEROL SERPL-MC: 122 UG/L (ref 20–75)

## 2023-09-01 ENCOUNTER — MYC MEDICAL ADVICE (OUTPATIENT)
Dept: FAMILY MEDICINE | Facility: CLINIC | Age: 58
End: 2023-09-01
Payer: COMMERCIAL

## 2023-09-01 DIAGNOSIS — D75.89 MACROCYTOSIS WITHOUT ANEMIA: Primary | ICD-10-CM

## 2023-09-01 DIAGNOSIS — R30.0 DYSURIA: ICD-10-CM

## 2023-09-06 RX ORDER — SULFAMETHOXAZOLE/TRIMETHOPRIM 800-160 MG
1 TABLET ORAL 2 TIMES DAILY
Qty: 6 TABLET | Refills: 0 | Status: SHIPPED | OUTPATIENT
Start: 2023-09-06 | End: 2023-12-13

## 2023-10-12 ENCOUNTER — LAB (OUTPATIENT)
Dept: LAB | Facility: CLINIC | Age: 58
End: 2023-10-12
Payer: COMMERCIAL

## 2023-10-12 DIAGNOSIS — D75.89 MACROCYTOSIS WITHOUT ANEMIA: ICD-10-CM

## 2023-10-12 LAB
BASO+EOS+MONOS # BLD AUTO: NORMAL 10*3/UL
BASO+EOS+MONOS NFR BLD AUTO: NORMAL %
BASOPHILS # BLD AUTO: 0 10E3/UL (ref 0–0.2)
BASOPHILS NFR BLD AUTO: 1 %
EOSINOPHIL # BLD AUTO: 0.1 10E3/UL (ref 0–0.7)
EOSINOPHIL NFR BLD AUTO: 3 %
ERYTHROCYTE [DISTWIDTH] IN BLOOD BY AUTOMATED COUNT: 11.9 % (ref 10–15)
HCT VFR BLD AUTO: 38.5 % (ref 35–47)
HGB BLD-MCNC: 12.6 G/DL (ref 11.7–15.7)
IMM GRANULOCYTES # BLD: 0 10E3/UL
IMM GRANULOCYTES NFR BLD: 0 %
LYMPHOCYTES # BLD AUTO: 1.6 10E3/UL (ref 0.8–5.3)
LYMPHOCYTES NFR BLD AUTO: 39 %
MCH RBC QN AUTO: 31.9 PG (ref 26.5–33)
MCHC RBC AUTO-ENTMCNC: 32.7 G/DL (ref 31.5–36.5)
MCV RBC AUTO: 98 FL (ref 78–100)
MONOCYTES # BLD AUTO: 0.3 10E3/UL (ref 0–1.3)
MONOCYTES NFR BLD AUTO: 8 %
NEUTROPHILS # BLD AUTO: 1.9 10E3/UL (ref 1.6–8.3)
NEUTROPHILS NFR BLD AUTO: 49 %
NRBC # BLD AUTO: 0 10E3/UL
NRBC BLD AUTO-RTO: 0 /100
PLATELET # BLD AUTO: 174 10E3/UL (ref 150–450)
RBC # BLD AUTO: 3.95 10E6/UL (ref 3.8–5.2)
RETICS # AUTO: 0.05 10E6/UL (ref 0.03–0.1)
RETICS/RBC NFR AUTO: 1.2 % (ref 0.5–2)
WBC # BLD AUTO: 4 10E3/UL (ref 4–11)

## 2023-10-12 PROCEDURE — 85025 COMPLETE CBC W/AUTO DIFF WBC: CPT

## 2023-10-12 PROCEDURE — 85045 AUTOMATED RETICULOCYTE COUNT: CPT

## 2023-10-12 PROCEDURE — 99207 BLOOD MORPHOLOGY PATHOLOGIST REVIEW: CPT | Performed by: PATHOLOGY

## 2023-10-12 PROCEDURE — 36415 COLL VENOUS BLD VENIPUNCTURE: CPT

## 2023-10-14 LAB
PATH REPORT.COMMENTS IMP SPEC: NORMAL
PATH REPORT.COMMENTS IMP SPEC: NORMAL
PATH REPORT.FINAL DX SPEC: NORMAL
PATH REPORT.MICROSCOPIC SPEC OTHER STN: NORMAL
PATH REPORT.MICROSCOPIC SPEC OTHER STN: NORMAL
PATH REPORT.RELEVANT HX SPEC: NORMAL

## 2023-12-11 ENCOUNTER — TELEPHONE (OUTPATIENT)
Dept: FAMILY MEDICINE | Facility: CLINIC | Age: 58
End: 2023-12-11
Payer: COMMERCIAL

## 2023-12-11 NOTE — TELEPHONE ENCOUNTER
[facility] phone call message- general phone call:    Reason for call: The patient would like a call back to discuss possible UTI symptoms.    Return call needed: Yes    OK to leave a message on voice mail? Yes    Primary language: English      needed? No    Call taken on December 11, 2023 at 4:01 PM by Amena Mike

## 2024-03-31 ENCOUNTER — HEALTH MAINTENANCE LETTER (OUTPATIENT)
Age: 59
End: 2024-03-31

## 2024-06-09 ENCOUNTER — HEALTH MAINTENANCE LETTER (OUTPATIENT)
Age: 59
End: 2024-06-09

## 2024-06-11 ENCOUNTER — TELEPHONE (OUTPATIENT)
Dept: FAMILY MEDICINE | Facility: CLINIC | Age: 59
End: 2024-06-11
Payer: COMMERCIAL

## 2024-06-11 DIAGNOSIS — H26.9 CATARACT, UNSPECIFIED CATARACT TYPE, UNSPECIFIED LATERALITY: Primary | ICD-10-CM

## 2024-06-11 NOTE — TELEPHONE ENCOUNTER
[facility] phone call message - order or referral request for patient:     Order or referral being requested: Referral       Additional Comments: Patient would like a referral sent to the [place] [facility] as she is trying to be seen for a cataract evaluation. Fax number is [phone], [facility].    OK to leave a message on voice mail? Yes    Primary language: English      needed? No    Call taken on June 11, 2024 at 2:09 PM by Jhoana Dunn

## 2024-08-22 ENCOUNTER — TRANSFERRED RECORDS (OUTPATIENT)
Dept: HEALTH INFORMATION MANAGEMENT | Facility: CLINIC | Age: 59
End: 2024-08-22
Payer: COMMERCIAL

## 2025-06-06 ENCOUNTER — TELEPHONE (OUTPATIENT)
Facility: CLINIC | Age: 60
End: 2025-06-06
Payer: COMMERCIAL

## 2025-06-10 NOTE — TELEPHONE ENCOUNTER
Retail Pharmacy Prior Authorization Team   Phone: [phone]    No current RX on file for this medication in [facility] from a [place] provider.   In order to process a prior authorization request, the PA team needs an active RX on file.

## 2025-06-15 ENCOUNTER — HEALTH MAINTENANCE LETTER (OUTPATIENT)
Age: 60
End: 2025-06-15